# Patient Record
Sex: FEMALE | Race: AMERICAN INDIAN OR ALASKA NATIVE
[De-identification: names, ages, dates, MRNs, and addresses within clinical notes are randomized per-mention and may not be internally consistent; named-entity substitution may affect disease eponyms.]

---

## 2019-08-05 ENCOUNTER — HOSPITAL ENCOUNTER (EMERGENCY)
Dept: HOSPITAL 97 - ER | Age: 29
Discharge: HOME | End: 2019-08-05
Payer: SELF-PAY

## 2019-08-05 DIAGNOSIS — M62.830: Primary | ICD-10-CM

## 2019-08-05 DIAGNOSIS — F17.210: ICD-10-CM
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
## 2019-08-05 PROCEDURE — 96375 TX/PRO/DX INJ NEW DRUG ADDON: CPT

## 2019-08-05 PROCEDURE — 72100 X-RAY EXAM L-S SPINE 2/3 VWS: CPT

## 2019-08-05 PROCEDURE — 96365 THER/PROPH/DIAG IV INF INIT: CPT

## 2019-08-05 PROCEDURE — 99284 EMERGENCY DEPT VISIT MOD MDM: CPT
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
## 2019-08-06 NOTE — RAD REPORT
EXAM DESCRIPTION:  RAD - Lumbar Spine 3 Views - 8/5/2019 10:10 pm

 

CLINICAL HISTORY:  Back pain

 

FINDINGS:  Hennessy rods extend from T12 -L4. The hardware appears intact.

 

Mild compression fracture involves the L2 vertebral body which presumably is chronic in should be com
pared to the prior history.

 

No acute fracture or dislocation noted.